# Patient Record
Sex: MALE | Race: WHITE | URBAN - METROPOLITAN AREA
[De-identification: names, ages, dates, MRNs, and addresses within clinical notes are randomized per-mention and may not be internally consistent; named-entity substitution may affect disease eponyms.]

---

## 2021-04-14 ENCOUNTER — OFFICE VISIT (OUTPATIENT)
Dept: URBAN - METROPOLITAN AREA CLINIC 78 | Facility: CLINIC | Age: 31
End: 2021-04-14

## 2021-05-21 ENCOUNTER — OFFICE VISIT (OUTPATIENT)
Dept: URBAN - METROPOLITAN AREA CLINIC 54 | Facility: CLINIC | Age: 31
End: 2021-05-21
Payer: SELF-PAY

## 2021-05-21 ENCOUNTER — WEB ENCOUNTER (OUTPATIENT)
Dept: URBAN - METROPOLITAN AREA CLINIC 54 | Facility: CLINIC | Age: 31
End: 2021-05-21

## 2021-05-21 DIAGNOSIS — E66.3 OVERWEIGHT (BMI 25.0-29.9): ICD-10-CM

## 2021-05-21 DIAGNOSIS — B18.2 CHRONIC HEPATITIS C WITHOUT HEPATIC COMA: ICD-10-CM

## 2021-05-21 DIAGNOSIS — Z72.0 TOBACCO ABUSE: ICD-10-CM

## 2021-05-21 DIAGNOSIS — Z83.79 FAMILY HISTORY OF CHRONIC LIVER DISEASE: ICD-10-CM

## 2021-05-21 DIAGNOSIS — F19.90 IVDU (INTRAVENOUS DRUG USER): ICD-10-CM

## 2021-05-21 PROCEDURE — 99204 OFFICE O/P NEW MOD 45 MIN: CPT | Performed by: INTERNAL MEDICINE

## 2021-05-21 NOTE — HPI-TODAY'S VISIT:
Patient is a 29 yo man self referred for above problem. A copy of this document will be sent to referring provided. He recently tested positive for HCV Ab. Risk factors included IVDU, last use being Sep 2020. He also consumes alcohol, heaviest use being zx 4 years (20/day). 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 He is also overweight and is hypertensive. He denies signs/symptoms of advanced liver disease. LFT's 3 x ULN with intact synthetic function. CT abdomen shows a normal liver. He has never had liver biopsy.

## 2021-05-23 LAB
A/G RATIO: 1.3
ALBUMIN: 4.5
ALKALINE PHOSPHATASE: 83
ALPHA 2-MACROGLOBULINS, QN: 127
ALT (SGPT) P5P: 103
ALT (SGPT): 91
APOLIPOPROTEIN A-1: 152
AST (SGOT): 35
BASO (ABSOLUTE): 0.1
BASOS: 1
BILIRUBIN, TOTAL: 0.3
BILIRUBIN, TOTAL: 0.4
BUN/CREATININE RATIO: 13
BUN: 11
CALCIUM: 9.9
CARBON DIOXIDE, TOTAL: 24
CHLORIDE: 102
COMMENT:: (no result)
CREATININE: 0.85
EGFR IF AFRICN AM: 133
EGFR IF NONAFRICN AM: 115
EOS (ABSOLUTE): 0.1
EOS: 2
FIBROSIS SCORE: 0.05
FIBROSIS SCORING:: (no result)
FIBROSIS STAGE: (no result)
GGT: 78
GLOBULIN, TOTAL: 3.4
GLUCOSE: 81
HAPTOGLOBIN: 194
HCV LOG10: 5.67
HEMATOCRIT: 50.3
HEMATOLOGY COMMENTS:: (no result)
HEMOGLOBIN: 17.2
HEPATITIS C GENOTYPE: 3
HEPATITIS C QUANTITATION: (no result)
IMMATURE CELLS: (no result)
IMMATURE GRANS (ABS): 0
IMMATURE GRANULOCYTES: 0
INR: 1.2
INTERPRETATIONS:: (no result)
LIMITATIONS:: (no result)
LYMPHS (ABSOLUTE): 2.3
LYMPHS: 25
Lab: (no result)
MCH: 29.8
MCHC: 34.2
MCV: 87
MONOCYTES(ABSOLUTE): 0.7
MONOCYTES: 8
NECROINFLAMM ACTIVITY SCORING:: (no result)
NECROINFLAMMAT ACTIVITY GRADE: (no result)
NECROINFLAMMAT ACTIVITY SCORE: 0.49
NEUTROPHILS (ABSOLUTE): 5.9
NEUTROPHILS: 64
NRBC: (no result)
PLATELETS: 376
POTASSIUM: 4.2
PROTEIN, TOTAL: 7.9
PROTHROMBIN TIME: 12.3
RBC: 5.77
RDW: 13.6
SODIUM: 141
TEST INFORMATION:: (no result)
WBC: 9.1

## 2021-05-24 ENCOUNTER — TELEPHONE ENCOUNTER (OUTPATIENT)
Dept: URBAN - METROPOLITAN AREA CLINIC 92 | Facility: CLINIC | Age: 31
End: 2021-05-24

## 2021-05-24 RX ORDER — GLECAPREVIR AND PIBRENTASVIR 40; 100 MG/1; MG/1
3 TABLETS TABLET, FILM COATED ORAL ONCE A DAY
Qty: 168 TABLET | Refills: 0 | OUTPATIENT
Start: 2021-05-24 | End: 2021-07-19

## 2021-06-25 ENCOUNTER — TELEPHONE ENCOUNTER (OUTPATIENT)
Dept: URBAN - METROPOLITAN AREA SURGERY CENTER 30 | Facility: SURGERY CENTER | Age: 31
End: 2021-06-25

## 2021-08-05 ENCOUNTER — LAB OUTSIDE AN ENCOUNTER (OUTPATIENT)
Dept: URBAN - METROPOLITAN AREA CLINIC 54 | Facility: CLINIC | Age: 31
End: 2021-08-05

## 2021-08-06 LAB
A/G RATIO: 1.3
ALBUMIN: 4.4
ALKALINE PHOSPHATASE: 78
ALT (SGPT): 28
AST (SGOT): 20
BILIRUBIN, TOTAL: 0.7
BUN/CREATININE RATIO: 12
BUN: 11
CALCIUM: 9.6
CARBON DIOXIDE, TOTAL: 23
CHLORIDE: 101
CREATININE: 0.89
EGFR IF AFRICN AM: 130
EGFR IF NONAFRICN AM: 112
GLOBULIN, TOTAL: 3.3
GLUCOSE: 164
HCV LOG10: (no result)
HEPATITIS C QUANTITATION: (no result)
POTASSIUM: 3.7
PROTEIN, TOTAL: 7.7
SODIUM: 141
TEST INFORMATION:: (no result)

## 2021-08-16 ENCOUNTER — OFFICE VISIT (OUTPATIENT)
Dept: URBAN - METROPOLITAN AREA CLINIC 54 | Facility: CLINIC | Age: 31
End: 2021-08-16
Payer: SELF-PAY

## 2021-08-16 DIAGNOSIS — B18.2 CHRONIC HEPATITIS C WITHOUT HEPATIC COMA: ICD-10-CM

## 2021-08-16 DIAGNOSIS — E66.3 OVERWEIGHT (BMI 25.0-29.9): ICD-10-CM

## 2021-08-16 DIAGNOSIS — Z72.0 TOBACCO ABUSE: ICD-10-CM

## 2021-08-16 DIAGNOSIS — F19.90 IVDU (INTRAVENOUS DRUG USER): ICD-10-CM

## 2021-08-16 DIAGNOSIS — Z83.79 FAMILY HISTORY OF CHRONIC LIVER DISEASE: ICD-10-CM

## 2021-08-16 PROBLEM — 128302006: Status: ACTIVE | Noted: 2021-05-21

## 2021-08-16 PROCEDURE — 99213 OFFICE O/P EST LOW 20 MIN: CPT | Performed by: INTERNAL MEDICINE

## 2021-08-16 NOTE — HPI-TODAY'S VISIT:
Patient is a 29 yo man self referred for above problem. A copy of this document will be sent to referring provided. He recently tested positive for HCV Ab. Risk factors included IVDU, last use being Sep 2020. He also consumes alcohol, heaviest use being x 4 years (20/day). 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 He is also overweight and is hypertensive. He denies signs/symptoms of advanced liver disease. LFT's 3 x ULN with intact synthetic function. CT abdomen shows a normal liver. He has never had liver biopsy.  Follow Up 8/16/21: Patient presents for routine follow up. He has completed 8 weeks of Mavyret and EOT VL negative. LFT's have normalized. Blood sugar noted at 164. No new complaints.

## 2021-08-16 NOTE — PHYSICAL EXAM NECK/THYROID:
normal appearance , without tenderness upon palpation , no deformities , trachea midline , Thyroid normal size , no thyroid nodules , no masses , no JVD , thyroid nontender Eyeglasses

## 2021-11-09 ENCOUNTER — DASHBOARD ENCOUNTERS (OUTPATIENT)
Age: 31
End: 2021-11-09

## 2021-11-19 ENCOUNTER — OFFICE VISIT (OUTPATIENT)
Dept: URBAN - METROPOLITAN AREA CLINIC 54 | Facility: CLINIC | Age: 31
End: 2021-11-19

## 2021-11-19 RX ORDER — GLECAPREVIR AND PIBRENTASVIR 40; 100 MG/1; MG/1
3 TABLETS TABLET, FILM COATED ORAL ONCE A DAY
Status: DISCONTINUED | COMMUNITY

## 2022-01-17 ENCOUNTER — OFFICE VISIT (OUTPATIENT)
Dept: URBAN - METROPOLITAN AREA CLINIC 10 | Facility: CLINIC | Age: 32
End: 2022-01-17
Payer: COMMERCIAL

## 2022-01-17 DIAGNOSIS — H16.001 CORNEAL ULCER OF RIGHT EYE: Primary | ICD-10-CM

## 2022-01-17 PROCEDURE — 99204 OFFICE O/P NEW MOD 45 MIN: CPT | Performed by: STUDENT IN AN ORGANIZED HEALTH CARE EDUCATION/TRAINING PROGRAM

## 2022-01-17 RX ORDER — OFLOXACIN 3 MG/ML
0.3 % SOLUTION/ DROPS OPHTHALMIC
Qty: 5 | Refills: 1 | Status: ACTIVE
Start: 2022-01-17

## 2022-01-17 RX ORDER — PREDNISOLONE ACETATE 10 MG/ML
1 % SUSPENSION/ DROPS OPHTHALMIC
Qty: 10 | Refills: 1 | Status: ACTIVE
Start: 2022-01-17

## 2022-01-17 RX ORDER — ERYTHROMYCIN 5 MG/G
OINTMENT OPHTHALMIC
Qty: 5 | Refills: 1 | Status: ACTIVE
Start: 2022-01-17

## 2022-01-17 ASSESSMENT — INTRAOCULAR PRESSURE
OD: 16
OS: 17

## 2022-01-17 NOTE — IMPRESSION/PLAN
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 Plan: Patient educated on condition and to stop sleeping in CL's
d/c CL wear OD
start
ofloxacin q2h OD x 1 week Pred QID OD x 1 week (very mild staining of ulcer today) RTC Wednesday for f/u (patient unable to RTC tomorrow) RTC ASAP if any vision changes